# Patient Record
Sex: FEMALE | Race: BLACK OR AFRICAN AMERICAN
[De-identification: names, ages, dates, MRNs, and addresses within clinical notes are randomized per-mention and may not be internally consistent; named-entity substitution may affect disease eponyms.]

---

## 2017-10-23 ENCOUNTER — HOSPITAL ENCOUNTER (EMERGENCY)
Dept: HOSPITAL 74 - JER | Age: 42
LOS: 1 days | Discharge: HOME | End: 2017-10-24
Payer: COMMERCIAL

## 2017-10-23 VITALS — HEART RATE: 88 BPM | SYSTOLIC BLOOD PRESSURE: 146 MMHG | DIASTOLIC BLOOD PRESSURE: 91 MMHG

## 2017-10-23 VITALS — TEMPERATURE: 98.7 F

## 2017-10-23 VITALS — BODY MASS INDEX: 38.4 KG/M2

## 2017-10-23 DIAGNOSIS — E66.01: ICD-10-CM

## 2017-10-23 DIAGNOSIS — M79.7: ICD-10-CM

## 2017-10-23 DIAGNOSIS — F41.8: ICD-10-CM

## 2017-10-23 DIAGNOSIS — E03.9: ICD-10-CM

## 2017-10-23 DIAGNOSIS — R00.2: Primary | ICD-10-CM

## 2017-10-23 LAB
ALBUMIN SERPL-MCNC: 3.2 G/DL (ref 3.4–5)
ALP SERPL-CCNC: 67 U/L (ref 45–117)
ALT SERPL-CCNC: 24 U/L (ref 12–78)
ANION GAP SERPL CALC-SCNC: 6 MMOL/L (ref 8–16)
APPEARANCE UR: (no result)
AST SERPL-CCNC: 11 U/L (ref 15–37)
BASOPHILS # BLD: 0.8 % (ref 0–2)
BILIRUB SERPL-MCNC: 0.3 MG/DL (ref 0.2–1)
BILIRUB UR STRIP.AUTO-MCNC: NEGATIVE MG/DL
CALCIUM SERPL-MCNC: 8.4 MG/DL (ref 8.5–10.1)
CK SERPL-CCNC: 136 IU/L (ref 26–192)
CO2 SERPL-SCNC: 28 MMOL/L (ref 21–32)
COLOR UR: YELLOW
CREAT SERPL-MCNC: 0.7 MG/DL (ref 0.55–1.02)
D DIMER PPP FEU-MCNC: 293 NG/ML
DEPRECATED RDW RBC AUTO: 14.6 % (ref 11.6–15.6)
EOSINOPHIL # BLD: 1.7 % (ref 0–4.5)
GLUCOSE SERPL-MCNC: 74 MG/DL (ref 74–106)
INR BLD: 0.96 (ref 0.82–1.09)
KETONES UR QL STRIP: NEGATIVE
MAGNESIUM SERPL-MCNC: 2.2 MG/DL (ref 1.8–2.4)
MCH RBC QN AUTO: 28.1 PG (ref 25.7–33.7)
MCHC RBC AUTO-ENTMCNC: 33.4 G/DL (ref 32–36)
MCV RBC: 84.1 FL (ref 80–96)
MUCOUS THREADS URNS QL MICRO: (no result)
NEUTROPHILS # BLD: 60.7 % (ref 42.8–82.8)
NITRITE UR QL STRIP: NEGATIVE
PH UR: 8 [PH] (ref 5–8)
PLATELET # BLD AUTO: 334 K/MM3 (ref 134–434)
PMV BLD: 7.3 FL (ref 7.5–11.1)
PROT SERPL-MCNC: 7 G/DL (ref 6.4–8.2)
PROT UR QL STRIP: NEGATIVE
PROT UR QL STRIP: NEGATIVE
PT PNL PPP: 10.9 SEC (ref 9.98–11.88)
RBC # BLD AUTO: 49 /HPF (ref 0–3)
RBC # UR STRIP: (no result) /UL
SP GR UR: 1.02 (ref 1–1.02)
TROPONIN I SERPL-MCNC: < 0.02 NG/ML (ref 0–0.05)
UROBILINOGEN UR STRIP-MCNC: NEGATIVE MG/DL (ref 0.2–1)
WBC # BLD AUTO: 10.6 K/MM3 (ref 4–10)
WBC # UR AUTO: 10 /HPF (ref 3–5)
YEAST #/AREA URNS HPF: (no result) /[HPF]

## 2017-10-23 PROCEDURE — 3E0337Z INTRODUCTION OF ELECTROLYTIC AND WATER BALANCE SUBSTANCE INTO PERIPHERAL VEIN, PERCUTANEOUS APPROACH: ICD-10-PCS

## 2017-10-23 PROCEDURE — 3E033GC INTRODUCTION OF OTHER THERAPEUTIC SUBSTANCE INTO PERIPHERAL VEIN, PERCUTANEOUS APPROACH: ICD-10-PCS

## 2017-10-23 NOTE — PDOC
History of Present Illness





- General


History Source: Patient


Exam Limitations: No Limitations





<AnicetoNakita - Last Filed: 10/23/17 21:48>





<Breanna Ortiz - Last Filed: 10/24/17 01:01>





- General


Chief Complaint: Palpitations


Stated Complaint: CHEST PAIN


Time Seen by Provider: 10/23/17 18:08





- History of Present Illness


Initial Comments: 





10/23/17 21:48


The patient is a 42 year old female, with a significant past medical history of 

Morbid Obesity, HLD, Depression, Anxiety, Fibromyalgia, Arthritis who presents 

to the emergency department with palpitations for the past four days. Patient 

reports sudden onset of palpitations at rest, initially intermittent however 

became constant today. Patient reports associated SOB however denies any chest 

pain, diaphoresis or dyspnea on exertion. Patient presents to the ED for 

further evaluation.     





She denies headache or dizziness. She denies fever, chills, abdominal pain, 

nausea, vomit, diarrhea or constipation. She denies dysuria, frequency, urgency 

or hematuria. Patient denies sick contacts or recent travel. 





Allergies: Penicillins 


Past surgical history: Hernia, D&C, C Section 2013, Thyrdoidectomy, Breast 

reduction, 


Social history: Social EtOH use. Former smoker (2011) Denied IVDA.


PCP: Dr. Marcos Anguiano 








 (Nakita Moreland)








Past History





<Nakita Moreland - Last Filed: 10/23/17 21:48>





- Past Medical History


Cardiac Disorders: Yes (heart murmur)


Hypercholesterolemia: Yes


Psychiatric Problems: Yes (depression/anxiety)


Thyroid Disease: Yes


Other medical history: fibromyalgia, inflammation of fascits, arthritis, 

pinched nerves on back





- Surgical History


Abdominal Surgery: Yes (Hernia, and D&C)





- Immunization History


Immunization Up to Date: Yes





- Suicide/Smoking/Psychosocial Hx


Smoking History: Never smoked


Have you smoked in the past 12 months: No


Information on smoking cessation initiated: No


Hx Alcohol Use: No


Drug/Substance Use Hx: No


Substance Use Type: Alcohol





<Breanna Ortiz - Last Filed: 10/24/17 01:01>





- Past Medical History


Allergies/Adverse Reactions: 


 Allergies











Allergy/AdvReac Type Severity Reaction Status Date / Time


 


Penicillins Allergy   Verified 10/23/17 20:22











Home Medications: 


Ambulatory Orders





Clonazepam [Klonopin] 1.25 mg PO DAILY 07/16/14 


Pregabalin [Lyrica -] 300 mg PO BID 07/16/14 


Cholecalciferol (Vitamin D3) [Vitamin D-3] 2,000 unit PO DAILY 02/02/16 


Cyclobenzaprine HCl [Flexeril -] 10 mg PO TID PRN 02/02/16 


Duloxetine HCl 60 mg PO DAILY 02/02/16 


Ibuprofen 600 mg PO TID 02/02/16 


Levothyroxine [Synthroid -] 88 mcg PO DAILY 02/02/16 


Atorvastatin Ca [Lipitor] 10 mg PO HS 10/23/17 


Frovatriptan Succinate [Frova] 2.5 mg PO ASDIR PRN 10/23/17 











**Review of Systems





- Review of Systems


Able to Perform ROS?: Yes





<Nakita Moreland - Last Filed: 10/23/17 21:48>





<Breanna Ortiz - Last Filed: 10/24/17 01:01>





- Review of Systems


Comments:: 





10/23/17 21:48


CONSTITUTIONAL: 


Absent: fever, chills, diaphoresis, generalized weakness, malaise, loss of 

appetite


HEENT: 


Absent: rhinorrhea, nasal congestion, throat pain, throat swelling, difficulty 

swallowing, mouth swelling, ear pain, eye pain, visual Changes


CARDIOVASCULAR: +palpitations. 


Absent: chest pain, syncope, palpitations, irregular heart rate, lightheadedness

, peripheral edema


RESPIRATORY: 


Absent: cough, shortness of breath, dyspnea with exertion, orthopnea, wheezing, 

stridor, hemoptysis


GASTROINTESTINAL:


Absent: abdominal pain, abdominal distension, nausea, vomiting, diarrhea, 

constipation, melena, hematochezia


GENITOURINARY: 


Absent: dysuria, frequency, urgency, hesitancy, hematuria, flank pain, genital 

pain


MUSCULOSKELETAL: 


Absent: myalgia, arthralgia, joint swelling


SKIN: 


Absent: rash, itching, pallor


HEMATOLOGIC/IMMUNOLOGIC: 


Absent: easy bleeding, easy bruising, lymphadenopathy, frequent infections


ENDOCRINE:


Absent: unexplained weight gain, unexplained weight loss, heat intolerance, 

cold intolerance


NEUROLOGIC: 


Absent: headache, focal weakness or paresthesias, dizziness, unsteady gait, 

seizure, mental status changes, bladder or bowel incontinence


PSYCHIATRIC: 


Absent: anxiety, depression, suicidal or homicidal ideation, hallucinations.


 (Nakita Moreland)








*Physical Exam





<Nakita Moreland - Last Filed: 10/23/17 21:48>





<Breanna Ortiz - Last Filed: 10/24/17 01:01>





- Vital Signs


 Last Vital Signs











Temp Pulse Resp BP Pulse Ox


 


 98.7 F   88   18   146/91   100 


 


 10/23/17 17:14  10/23/17 19:58  10/23/17 19:58  10/23/17 19:58  10/23/17 19:58

















- Physical Exam


Comments: 





10/23/17 21:49


GENERAL:


+Obese. Well developed, well nourished. Awake and alert. No acute distress.


HEENT:


Normocephalic, atraumatic. PERRLA, EOMI. No conjunctival pallor. Sclera are non-

icteric. Moist mucous membranes. Oropharynx is clear.


NECK: Supple. Full ROM. No JVD. Carotid pulses 2+ and symmetric, without 

bruits. No thyromegaly. No lymphadenopathy.


CARDIOVASCULAR:


+Sinus tachycardia. Regular rhythm. No murmurs, rubs, or gallops. Distal pulses 

are 2+ and symmetric. 


PULMONARY: 


No evidence of respiratory distress. Lungs clear to auscultation bilaterally. 

No wheezing, rales or rhonchi.


ABDOMINAL:


+Protuberant. Soft. Non-tender. Non-distended. No rebound or guarding. No 

organomegaly. Normoactive bowel sounds. 


MUSCULOSKELETAL 


Normal range of motion at all joints. No bony deformities or tenderness. No CVA 

tenderness.


EXTREMITIES: 


No cyanosis. No clubbing. No edema. No calf tenderness.


SKIN: Warm and dry. Normal capillary refill. No rashes. No jaundice. 


NEUROLOGICAL: Alert, awake, appropriate. Cranial nerves 2-12 intact. No 

deficits to light touch and temperature in face, upper extremities and lower 

extremities. No motor deficits in the in face, upper extremities and lower 

extremities. Normoreflexic in the upper and lower extremities. Normal speech. 

Toes are down-going bilaterally. Gait is normal without ataxia.


PSYCHIATRIC: 


Cooperative. Good eye contact. Appropriate mood and affect.





 (Nakita Moreland)








ED Treatment Course





- LABORATORY


CBC & Chemistry Diagram: 


 10/23/17 19:40





 10/23/17 19:40





<Nakita Moreland - Last Filed: 10/23/17 21:48>





- LABORATORY


CBC & Chemistry Diagram: 


 10/23/17 19:40





 10/23/17 19:40





<Breanna Ortiz - Last Filed: 10/24/17 01:01>





- ADDITIONAL ORDERS


Additional order review: 


 Laboratory  Results











  10/23/17 10/23/17 10/23/17





  20:30 20:30 19:40


 


PT with INR   


 


INR   


 


D-Dimer   


 


Sodium    139


 


Potassium    4.3


 


Chloride    105


 


Carbon Dioxide    28


 


Anion Gap    6 L


 


BUN    7  D


 


Creatinine    0.7


 


Creat Clearance w eGFR    > 60


 


Random Glucose    74


 


Calcium    8.4 L


 


Magnesium    2.2


 


Total Bilirubin    0.3


 


AST    11 L D


 


ALT    24


 


Alkaline Phosphatase    67


 


Creatine Kinase    136


 


Troponin I    < 0.02


 


B-Natriuretic Peptide    69.90


 


Total Protein    7.0


 


Albumin    3.2 L


 


Serum Pregnancy, Qual  Negative  


 


Urine Color   Yellow 


 


Urine Appearance   Cloudy 


 


Urine pH   8.0  D 


 


Ur Specific Gravity   1.020 


 


Urine Protein   Negative 


 


Urine Glucose (UA)   Negative 


 


Urine Ketones   Negative 


 


Urine Blood   3+ H 


 


Urine Nitrite   Negative 


 


Urine Bilirubin   Negative 


 


Urine Urobilinogen   Negative 


 


Ur Leukocyte Esterase   Negative 


 


Urine RBC   49 


 


Urine WBC   10 


 


Ur Epithelial Cells   Rare 


 


Urine Mucus   Rare 


 


Urine Yeast   Few 














  10/23/17





  19:40


 


PT with INR  10.90


 


INR  0.96


 


D-Dimer  293 H


 


Sodium 


 


Potassium 


 


Chloride 


 


Carbon Dioxide 


 


Anion Gap 


 


BUN 


 


Creatinine 


 


Creat Clearance w eGFR 


 


Random Glucose 


 


Calcium 


 


Magnesium 


 


Total Bilirubin 


 


AST 


 


ALT 


 


Alkaline Phosphatase 


 


Creatine Kinase 


 


Troponin I 


 


B-Natriuretic Peptide 


 


Total Protein 


 


Albumin 


 


Serum Pregnancy, Qual 


 


Urine Color 


 


Urine Appearance 


 


Urine pH 


 


Ur Specific Gravity 


 


Urine Protein 


 


Urine Glucose (UA) 


 


Urine Ketones 


 


Urine Blood 


 


Urine Nitrite 


 


Urine Bilirubin 


 


Urine Urobilinogen 


 


Ur Leukocyte Esterase 


 


Urine RBC 


 


Urine WBC 


 


Ur Epithelial Cells 


 


Urine Mucus 


 


Urine Yeast 








 











  10/23/17





  19:40


 


RBC  4.12


 


MCV  84.1


 


MCHC  33.4


 


RDW  14.6


 


MPV  7.3 L D


 


Neutrophils %  60.7  D


 


Lymphocytes %  28.2  D


 


Monocytes %  8.6


 


Eosinophils %  1.7


 


Basophils %  0.8

















- RADIOLOGY


Radiology Studies Ordered: 














 Category Date Time Status


 


 CHEST CTA [CT] Stat CT Scan  10/23/17 21:02 Completed

















- Medications


Given in the ED: 


ED Medications














Discontinued Medications














Generic Name Dose Route Start Last Admin





  Trade Name Freq  PRN Reason Stop Dose Admin


 


Aspirin  162 mg 10/23/17 19:22 10/23/17 19:56





  Asa -  PO 10/23/17 19:23  162 mg





  ONCE ONE   Administration


 


Diphenhydramine HCl  25 mg 10/23/17 22:21 10/23/17 22:50





  Benadryl Injection -  IVPUSH 10/23/17 22:22  25 mg





  ONCE ONE   Administration


 


Sodium Chloride  1,000 mls @ 1,000 mls/hr 10/23/17 21:03 10/23/17 21:32





  Normal Saline -  IV 10/23/17 22:02  1,000 mls/hr





  ASDIR STA   Administration


 


Ondansetron HCl  4 mg 10/23/17 22:20 10/23/17 22:50





  Zofran Injection  IVPUSH 10/23/17 22:21  4 mg





  ONCE ONE   Administration

















Medical Decision Making





<Nakita Moreland - Last Filed: 10/23/17 21:48>





<Breanna Ortiz - Last Filed: 10/24/17 01:01>





- Medical Decision Making





10/24/17 00:58


42-year-old female presents with 4 days of palpitations and has been feeling 

short of breath today.  She has past medical history significant for DVT, 

fibromyalgia, obesity, hypercholesterolemia





EKG was normal sinus rhythm at 99 bpm.  When compared to the previous EKG there 

is no significant changes





She was not hypoxic.





CBC was unremarkable.  Her electrolytes, liver function tests, cardiac enzymes 

were all within normal limits





CT of the chest was negative for any pulmonary embolus





Impression palpitations





On further discussion, the patient did admit to see a cardiologist and having a 

echo done, but then never went back to see him whenshe became symptomatic (

Breanna Ortiz)








*DC/Admit/Observation/Transfer





<Nakita Moreland - Last Filed: 10/23/17 21:48>





<Breanna Ortiz - Last Filed: 10/24/17 01:01>


Diagnosis at time of Disposition: 


 Palpitations





- Discharge Dispostion


Disposition: HOME


Condition at time of disposition: Stable





- Referrals


Referrals: 


Silvio Rodríguez [Primary Care Provider] - 





- Patient Instructions


Printed Discharge Instructions:  DI for Palpitations


Additional Instructions: 


please follow up with your regular physician 


I have given you the name of a heart doctor for further evaluation


return for any worsening  symptoms





- Attestations


Scribe Attestion: 





10/23/17 21:49





Documentation prepared by Nakita Moreland, acting as medical scribe for Breanna Ortiz MD (Nakita Moreland)

## 2017-10-25 NOTE — EKG
Test Reason : 

Blood Pressure : ***/*** mmHG

Vent. Rate : 099 BPM     Atrial Rate : 099 BPM

   P-R Int : 168 ms          QRS Dur : 082 ms

    QT Int : 350 ms       P-R-T Axes : 061 056 023 degrees

   QTc Int : 449 ms

 

NORMAL SINUS RHYTHM

NONSPECIFIC T WAVE ABNORMALITY

ABNORMAL ECG

WHEN COMPARED WITH ECG OF 02-FEB-2016 20:03,

NO SIGNIFICANT CHANGE WAS FOUND

Confirmed by MIR TAYLOR MD ([id number]) on 10/25/2017 9:33:53 AM

 

Referred By:             Confirmed By:MIR TAYLOR MD

## 2019-04-22 ENCOUNTER — HOSPITAL ENCOUNTER (EMERGENCY)
Dept: HOSPITAL 74 - JERFT | Age: 44
Discharge: HOME | End: 2019-04-22
Payer: COMMERCIAL

## 2019-04-22 VITALS — SYSTOLIC BLOOD PRESSURE: 120 MMHG | HEART RATE: 91 BPM | DIASTOLIC BLOOD PRESSURE: 77 MMHG | TEMPERATURE: 98.5 F

## 2019-04-22 VITALS — BODY MASS INDEX: 37.1 KG/M2

## 2019-04-22 DIAGNOSIS — S93.401A: Primary | ICD-10-CM

## 2019-04-22 DIAGNOSIS — R01.1: ICD-10-CM
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
## 2019-04-22 DIAGNOSIS — M79.7: ICD-10-CM
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
## 2019-04-22 DIAGNOSIS — F41.8: ICD-10-CM
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
## 2019-04-22 DIAGNOSIS — S80.02XA: ICD-10-CM

## 2019-04-22 DIAGNOSIS — E78.00: ICD-10-CM

## 2019-04-22 DIAGNOSIS — F32.9: ICD-10-CM

## 2019-04-22 NOTE — PDOC
History of Present Illness





- General


Chief Complaint: Injury


Stated Complaint: FALL


Time Seen by Provider: 04/22/19 17:00





- History of Present Illness


Initial Comments: 





04/22/19 18:10


44-year-old female with a past medical history of dyslipidemia and fibromyalgia 

presents after a mechanical fall today for right ankle pain and left knee pain. 

No loss consciousness or post injury nausea vomiting or visual changes.





Past History





- Past Medical History


Allergies/Adverse Reactions: 


 Allergies











Allergy/AdvReac Type Severity Reaction Status Date / Time


 


Penicillins Allergy   Verified 04/22/19 17:23











Home Medications: 


Ambulatory Orders





Clonazepam [Klonopin] 1.25 mg PO DAILY 07/16/14 


Pregabalin [Lyrica -] 300 mg PO BID 07/16/14 


Cholecalciferol (Vitamin D3) [Vitamin D-3] 2,000 unit PO DAILY 02/02/16 


Cyclobenzaprine HCl [Flexeril -] 10 mg PO TID PRN 02/02/16 


Duloxetine HCl 60 mg PO DAILY 02/02/16 


Ibuprofen 600 mg PO TID 02/02/16 


Levothyroxine [Synthroid -] 88 mcg PO DAILY 02/02/16 


Atorvastatin Ca [Lipitor] 10 mg PO HS 10/23/17 


Frovatriptan Succinate [Frova] 2.5 mg PO ASDIR PRN 10/23/17 








Cardiac Disorders: Yes (heart murmur)


COPD: No


Hypercholesterolemia: Yes


Psychiatric Problems: Yes (depression/anxiety)


Thyroid Disease: Yes





- Surgical History


Abdominal Surgery: Yes (Hernia, and D&C)





- Immunization History


Immunization Up to Date: Yes





- Suicide/Smoking/Psychosocial Hx


Smoking History: Never smoked


Have you smoked in the past 12 months: No


Information on smoking cessation initiated: No


Hx Alcohol Use: No


Drug/Substance Use Hx: No


Substance Use Type: Alcohol





**Review of Systems





- Review of Systems


Musculoskeletal: Yes: See HPI, Joint Pain





*Physical Exam





- Vital Signs


 Last Vital Signs











Temp Pulse Resp BP Pulse Ox


 


 98.5 F   91 H  16   120/77   100 


 


 04/22/19 17:00  04/22/19 17:00  04/22/19 17:00  04/22/19 17:00  04/22/19 17:00














- Physical Exam


Comments: 





04/22/19 18:11


Left knee skin color and temperature are normal range of motion is full. She 

has diffuse tenderness which is out of proportion to the examination. No 

instability or gross sensorimotor deficits. She is neurovascularly intact. Eyes 

and calves are soft and nontender.





Right ankle skin color and temperature are normal. There is mild lateral 

swelling. She has no tenderness about the right knee proximal fibula or along 

its distal coarse. No tenderness about the medial malleolus navicular base of 

the fifth metatarsal. Mild tenderness over the lateral malleolus and ATFL. She 

is unable to tolerate stability testing she is neurovascularly intact free of 

any gross sensorimotor deficits thighs and calves are soft and nontender.





Medical Decision Making





- Medical Decision Making





04/22/19 18:12


This is a 44-year-old female with a past medical history of fibromyalgia. She 

is very tender all over even to light touch which is out of proportion to the 

examination. This is a right ankle sprain in the left knee contusion. She may 

weight-bear as tolerated with the use of crutches and Aircast follow-up with 

orthopedic surgery discussed use of Tylenol and Motrin for pain.





*DC/Admit/Observation/Transfer


Diagnosis at time of Disposition: 


 Ankle sprain, Knee contusion





Ankle injury


Qualifiers:


 Encounter type: initial encounter Laterality: right Qualified Code(s): 

S99.911A - Unspecified injury of right ankle, initial encounter








- Discharge Dispostion


Disposition: HOME


Condition at time of disposition: Stable


Decision to Admit order: No





- Referrals


Referrals: 


ON STAFF,NOT [Primary Care Provider] - 


Magno Jimenez DO [Staff Physician] - 





- Patient Instructions


Printed Discharge Instructions:  Ankle Sprain, DI for Ankle Sprain, Contusion


Additional Instructions: 


He may weight-bear as tolerated with use of crutches and the Aircast. Tylenol 

and Motrin as directed for pain. Return to the emergency room for worsening 

symptoms and follow-up with orthopedic surgery in 1-2 days for further 

evaluation and treatment options. He may remove the ankle splint for hygiene 

and comfort as well as gentle range of motion exercises.





- Post Discharge Activity

## 2019-10-18 ENCOUNTER — HOSPITAL ENCOUNTER (EMERGENCY)
Dept: HOSPITAL 74 - JERFT | Age: 44
Discharge: HOME | End: 2019-10-18
Payer: COMMERCIAL

## 2019-10-18 VITALS — TEMPERATURE: 97.5 F | DIASTOLIC BLOOD PRESSURE: 84 MMHG | SYSTOLIC BLOOD PRESSURE: 127 MMHG | HEART RATE: 83 BPM

## 2019-10-18 VITALS — BODY MASS INDEX: 36.1 KG/M2
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
## 2019-10-18 DIAGNOSIS — Z88.0: ICD-10-CM

## 2019-10-18 DIAGNOSIS — S93.401A: Primary | ICD-10-CM
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
## 2019-10-18 DIAGNOSIS — E78.00: ICD-10-CM

## 2019-10-18 DIAGNOSIS — F41.8: ICD-10-CM

## 2019-10-18 DIAGNOSIS — R01.1: ICD-10-CM
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
## 2019-10-18 DIAGNOSIS — M79.7: ICD-10-CM

## 2019-10-18 DIAGNOSIS — E07.9: ICD-10-CM

## 2019-10-18 DIAGNOSIS — K46.9: ICD-10-CM

## 2019-10-18 NOTE — PDOC
History of Present Illness





- General


Chief Complaint: Injury


Stated Complaint: INJURY


Time Seen by Provider: 10/18/19 15:23


History Source: Patient





- History of Present Illness


Occurred: reports: this afternoon


Pain Location: reports: lower extremity


Method of Injury: Yes: fall





Past History





- Past Medical History


Allergies/Adverse Reactions: 


 Allergies











Allergy/AdvReac Type Severity Reaction Status Date / Time


 


Penicillins Allergy   Verified 10/18/19 15:26











Home Medications: 


Ambulatory Orders





Clonazepam [Klonopin] 1.25 mg PO DAILY 07/16/14 


Pregabalin [Lyrica -] 300 mg PO BID 07/16/14 


Cholecalciferol (Vitamin D3) [Vitamin D-3] 2,000 unit PO DAILY 02/02/16 


Cyclobenzaprine HCl [Flexeril -] 10 mg PO TID PRN 02/02/16 


Duloxetine HCl 60 mg PO DAILY 02/02/16 


Ibuprofen 600 mg PO TID 02/02/16 


Levothyroxine [Synthroid -] 88 mcg PO DAILY 02/02/16 


Atorvastatin Ca [Lipitor] 10 mg PO HS 10/23/17 


Frovatriptan Succinate [Frova] 2.5 mg PO ASDIR PRN 10/23/17 








Cardiac Disorders: Yes (heart murmur)


COPD: No


Hypercholesterolemia: Yes


Psychiatric Problems: Yes (depression/anxiety)


Thyroid Disease: Yes


Other medical history: fibromyalgia





- Surgical History


Abdominal Surgery: Yes (Hernia, and D&C)





- Immunization History


Immunization Up to Date: Yes





- Psycho Social/Smoking Cessation Hx


Smoking History: Never smoked


Have you smoked in the past 12 months: No


Hx Alcohol Use: Yes


Drug/Substance Use Hx: No


Substance Use Type: Alcohol





**Review of Systems





- Review of Systems


Musculoskeletal: Yes: Joint Pain, Joint Swelling.  No: Back Pain, Neck Pain


Neurological: No: Headache





*Physical Exam





- Vital Signs


 Last Vital Signs











Temp Pulse Resp BP Pulse Ox


 


 97.5 F L  83   16   127/84   100 


 


 10/18/19 15:22  10/18/19 15:22  10/18/19 15:22  10/18/19 15:22  10/18/19 15:22














- Physical Exam


General Appearance: Yes: Appropriately Dressed, Mild Distress


HEENT: positive: Normal Voice


Neck: positive: Supple


Respiratory/Chest: negative: Respiratory Distress


Extremity: positive: Swelling (minimal to lat malleolus of R ankle)


Integumentary: positive: Dry, Warm


Neurologic: positive: Fully Oriented, Alert, Normal Mood/Affect





Medical Decision Making





- Medical Decision Making





10/18/19 15:39


44-year-old female, no significant history, here w/ R ankle pain and swelling s/

p fall.  Patient states today at work, her R ankle gave out causing her to fall 

forward.  States ankle pain now radiating up to her right hip.  Painful to bear 

weight.  Patient reports that her ankle has given out multiple times in the 

past but has never been evaluated by orthopedics.





see exam





R ankle sprain


XR neg


-ACE placed and crutches given, dose of motrin given


-Dc w/ [facility] and ortho f/u











Discharge





- Discharge Information


Problems reviewed: Yes


Clinical Impression/Diagnosis: 


Fall


Qualifiers:


 Encounter type: initial encounter Qualified Code(s): W19.XXXA - Unspecified 

fall, initial encounter





Ankle sprain


Qualifiers:


 Encounter type: initial encounter Involved ligament of ankle: unspecified 

ligament Laterality: right Qualified Code(s): S93.401A - Sprain of unspecified 

ligament of right ankle, initial encounter





Disposition: HOME





- Follow up/Referral


Referrals: 


Manny Khan MD [Staff Physician] - 





- Patient Discharge Instructions


Patient Printed Discharge Instructions:  DI for Ankle Sprain


Additional Instructions: 


Take Motrin or Tylenol for pain and follow-up with Dr. Khan of orthopedics





- Post Discharge Activity


Work/Back to School Note:  Back to Work

## 2019-10-18 NOTE — PDOC
Rapid Medical Evaluation


Time Seen by Provider: 10/18/19 15:23


Medical Evaluation: 


 Allergies











Allergy/AdvReac Type Severity Reaction Status Date / Time


 


Penicillins Allergy   Verified 04/22/19 17:23











10/18/19 15:23


I have performed a brief in-person evaluation of this patient.





The patient presents with a chief complaint of: s/p fall at work. Her ankle 

gave out on her and she landed on 





Pertinent physical exam findings: R sided hip/knee/ankle tenderness





I have ordered the following: Xrays





The patient will proceed to the ED for further evaluation.





**Discharge Disposition





- Diagnosis


 Fall








- Referrals





- Patient Instructions





- Post Discharge Activity

## 2021-10-27 ENCOUNTER — HOSPITAL ENCOUNTER (EMERGENCY)
Dept: HOSPITAL 74 - JER | Age: 46
LOS: 1 days | Discharge: HOME | End: 2021-10-28
Payer: COMMERCIAL

## 2021-10-27 VITALS — DIASTOLIC BLOOD PRESSURE: 86 MMHG | SYSTOLIC BLOOD PRESSURE: 158 MMHG | TEMPERATURE: 98.4 F

## 2021-10-27 VITALS — BODY MASS INDEX: 40.6 KG/M2

## 2021-10-27 DIAGNOSIS — R00.2: ICD-10-CM

## 2021-10-27 DIAGNOSIS — N83.201: Primary | ICD-10-CM

## 2021-10-27 DIAGNOSIS — N39.0: ICD-10-CM

## 2021-10-27 PROCEDURE — 3E033NZ INTRODUCTION OF ANALGESICS, HYPNOTICS, SEDATIVES INTO PERIPHERAL VEIN, PERCUTANEOUS APPROACH: ICD-10-PCS

## 2021-10-28 VITALS — HEART RATE: 74 BPM

## 2021-10-28 LAB
ALBUMIN SERPL-MCNC: 3.6 G/DL (ref 3.4–5)
ALP SERPL-CCNC: 75 U/L (ref 45–117)
ALT SERPL-CCNC: 31 U/L (ref 13–61)
ANION GAP SERPL CALC-SCNC: 5 MMOL/L (ref 8–16)
APPEARANCE UR: CLEAR
APTT BLD: 31.4 SECONDS (ref 25.2–36.5)
AST SERPL-CCNC: 25 U/L (ref 15–37)
BACTERIA # UR AUTO: 74 /UL (ref 0–1359)
BASOPHILS # BLD: 1.2 % (ref 0–2)
BILIRUB SERPL-MCNC: 0.3 MG/DL (ref 0.2–1)
BILIRUB UR STRIP.AUTO-MCNC: NEGATIVE MG/DL
BUN SERPL-MCNC: 10.9 MG/DL (ref 7–18)
CALCIUM SERPL-MCNC: 8.9 MG/DL (ref 8.5–10.1)
CASTS URNS QL MICRO: 0 /UL (ref 0–3.1)
CHLORIDE SERPL-SCNC: 106 MMOL/L (ref 98–107)
CO2 SERPL-SCNC: 29 MMOL/L (ref 21–32)
COLOR UR: YELLOW
CREAT SERPL-MCNC: 0.9 MG/DL (ref 0.55–1.3)
DEPRECATED RDW RBC AUTO: 14.9 % (ref 11.6–15.6)
EOSINOPHIL # BLD: 2.5 % (ref 0–4.5)
EPITH CASTS URNS QL MICRO: 13 /UL (ref 0–25.1)
GLUCOSE SERPL-MCNC: 126 MG/DL (ref 74–106)
HCT VFR BLD CALC: 35.6 % (ref 32.4–45.2)
HGB BLD-MCNC: 12 GM/DL (ref 10.7–15.3)
INR BLD: 1.01 (ref 0.83–1.09)
KETONES UR QL STRIP: NEGATIVE
LEUKOCYTE ESTERASE UR QL STRIP.AUTO: (no result)
LYMPHOCYTES # BLD: 30.4 % (ref 8–40)
MCH RBC QN AUTO: 28.2 PG (ref 25.7–33.7)
MCHC RBC AUTO-ENTMCNC: 33.8 G/DL (ref 32–36)
MCV RBC: 83.3 FL (ref 80–96)
MONOCYTES # BLD AUTO: 4.9 % (ref 3.8–10.2)
NEUTROPHILS # BLD: 61 % (ref 42.8–82.8)
NITRITE UR QL STRIP: NEGATIVE
PH UR: 5.5 [PH] (ref 5–8)
PLATELET # BLD AUTO: 396 10^3/UL (ref 134–434)
PMV BLD: 7.2 FL (ref 7.5–11.1)
PROT SERPL-MCNC: 8.1 G/DL (ref 6.4–8.2)
PROT UR QL STRIP: NEGATIVE
PROT UR QL STRIP: NEGATIVE
PT PNL PPP: 11.3 SEC (ref 9.7–13)
RBC # BLD AUTO: 17 /UL (ref 0–23.9)
RBC # BLD AUTO: 4.27 M/MM3 (ref 3.6–5.2)
SODIUM SERPL-SCNC: 139 MMOL/L (ref 136–145)
SP GR UR: 1.02 (ref 1.01–1.03)
UROBILINOGEN UR STRIP-MCNC: 0.2 MG/DL (ref 0.2–1)
WBC # BLD AUTO: 10.7 K/MM3 (ref 4–10)
WBC # UR AUTO: 29 /UL (ref 0–25.8)

## 2021-11-05 ENCOUNTER — HOSPITAL ENCOUNTER (INPATIENT)
Dept: HOSPITAL 74 - JER | Age: 46
LOS: 3 days | Discharge: HOME | DRG: 111 | End: 2021-11-08
Attending: GENERAL ACUTE CARE HOSPITAL | Admitting: INTERNAL MEDICINE
Payer: COMMERCIAL

## 2021-11-05 VITALS — BODY MASS INDEX: 38.2 KG/M2

## 2021-11-05 DIAGNOSIS — E03.9: ICD-10-CM

## 2021-11-05 DIAGNOSIS — Z86.718: ICD-10-CM

## 2021-11-05 DIAGNOSIS — E66.9: ICD-10-CM

## 2021-11-05 DIAGNOSIS — E78.5: ICD-10-CM

## 2021-11-05 DIAGNOSIS — N39.0: ICD-10-CM

## 2021-11-05 DIAGNOSIS — R07.89: ICD-10-CM

## 2021-11-05 DIAGNOSIS — R42: Primary | ICD-10-CM

## 2021-11-05 DIAGNOSIS — M79.7: ICD-10-CM

## 2021-11-05 DIAGNOSIS — L90.0: ICD-10-CM

## 2021-11-05 DIAGNOSIS — N83.201: ICD-10-CM

## 2021-11-05 DIAGNOSIS — Z88.0: ICD-10-CM

## 2021-11-05 DIAGNOSIS — R74.01: ICD-10-CM

## 2021-11-05 DIAGNOSIS — M62.81: ICD-10-CM

## 2021-11-05 DIAGNOSIS — I10: ICD-10-CM

## 2021-11-05 DIAGNOSIS — R10.2: ICD-10-CM

## 2021-11-05 LAB
ALBUMIN SERPL-MCNC: 3.4 G/DL (ref 3.4–5)
ALP SERPL-CCNC: 121 U/L (ref 45–117)
ALT SERPL-CCNC: 69 U/L (ref 13–61)
ANION GAP SERPL CALC-SCNC: 11 MMOL/L (ref 8–16)
ANISOCYTOSIS BLD QL: (no result)
APPEARANCE UR: CLEAR
AST SERPL-CCNC: 75 U/L (ref 15–37)
BACTERIA # UR AUTO: 411 /UL (ref 0–1359)
BILIRUB SERPL-MCNC: 0.5 MG/DL (ref 0.2–1)
BILIRUB UR STRIP.AUTO-MCNC: (no result) MG/DL
BUN SERPL-MCNC: 10.8 MG/DL (ref 7–18)
CALCIUM SERPL-MCNC: 8.7 MG/DL (ref 8.5–10.1)
CASTS URNS QL MICRO: 13 /UL (ref 0–3.1)
CHLORIDE SERPL-SCNC: 104 MMOL/L (ref 98–107)
CO2 SERPL-SCNC: 23 MMOL/L (ref 21–32)
COLOR UR: (no result)
CREAT SERPL-MCNC: 0.8 MG/DL (ref 0.55–1.3)
DEPRECATED RDW RBC AUTO: 15 % (ref 11.6–15.6)
EPITH CASTS URNS QL MICRO: >36 /UL (ref 0–25.1)
ERYTHROCYTE [SEDIMENTATION RATE] IN BLOOD BY WESTERGREN METHOD: 41 MM/HR (ref 0–20)
GLUCOSE SERPL-MCNC: 91 MG/DL (ref 74–106)
HCT VFR BLD CALC: 39.2 % (ref 32.4–45.2)
HGB BLD-MCNC: 13.1 GM/DL (ref 10.7–15.3)
KETONES UR QL STRIP: (no result)
LEUKOCYTE ESTERASE UR QL STRIP.AUTO: (no result)
LIPASE SERPL-CCNC: 71 U/L (ref 73–393)
MACROCYTES BLD QL: 0
MAGNESIUM SERPL-MCNC: 2.2 MG/DL (ref 1.8–2.4)
MCH RBC QN AUTO: 27.7 PG (ref 25.7–33.7)
MCHC RBC AUTO-ENTMCNC: 33.5 G/DL (ref 32–36)
MCV RBC: 82.9 FL (ref 80–96)
NITRITE UR QL STRIP: NEGATIVE
PH UR: 6 [PH] (ref 5–8)
PLATELET # BLD AUTO: 256 10^3/UL (ref 134–434)
PLATELET BLD QL SMEAR: NORMAL
PMV BLD: 8 FL (ref 7.5–11.1)
PROT SERPL-MCNC: 8 G/DL (ref 6.4–8.2)
PROT UR QL STRIP: (no result)
PROT UR QL STRIP: NEGATIVE
RBC # BLD AUTO: 12 /UL (ref 0–23.9)
RBC # BLD AUTO: 4.73 M/MM3 (ref 3.6–5.2)
SODIUM SERPL-SCNC: 138 MMOL/L (ref 136–145)
SP GR UR: 1.04 (ref 1.01–1.03)
UROBILINOGEN UR STRIP-MCNC: 1 MG/DL (ref 0.2–1)
WBC # BLD AUTO: 5.1 K/MM3 (ref 4–10)
WBC # UR AUTO: 64 /UL (ref 0–25.8)

## 2021-11-05 PROCEDURE — U0003 INFECTIOUS AGENT DETECTION BY NUCLEIC ACID (DNA OR RNA); SEVERE ACUTE RESPIRATORY SYNDROME CORONAVIRUS 2 (SARS-COV-2) (CORONAVIRUS DISEASE [COVID-19]), AMPLIFIED PROBE TECHNIQUE, MAKING USE OF HIGH THROUGHPUT TECHNOLOGIES AS DESCRIBED BY CMS-2020-01-R: HCPCS

## 2021-11-05 PROCEDURE — U0005 INFEC AGEN DETEC AMPLI PROBE: HCPCS

## 2021-11-05 PROCEDURE — C9803 HOPD COVID-19 SPEC COLLECT: HCPCS

## 2021-11-06 LAB
ALBUMIN SERPL-MCNC: 2.9 G/DL (ref 3.4–5)
ALP SERPL-CCNC: 90 U/L (ref 45–117)
ALT SERPL-CCNC: 52 U/L (ref 13–61)
ANION GAP SERPL CALC-SCNC: 5 MMOL/L (ref 8–16)
ANISOCYTOSIS BLD QL: (no result)
APTT BLD: 24.2 SECONDS (ref 25.2–36.5)
AST SERPL-CCNC: 44 U/L (ref 15–37)
BILIRUB SERPL-MCNC: 0.3 MG/DL (ref 0.2–1)
BUN SERPL-MCNC: 8.8 MG/DL (ref 7–18)
CALCIUM SERPL-MCNC: 8.1 MG/DL (ref 8.5–10.1)
CHLORIDE SERPL-SCNC: 106 MMOL/L (ref 98–107)
CHOLEST SERPL-MCNC: 186 MG/DL (ref 50–200)
CO2 SERPL-SCNC: 28 MMOL/L (ref 21–32)
CREAT SERPL-MCNC: 0.7 MG/DL (ref 0.55–1.3)
DEPRECATED RDW RBC AUTO: 15.1 % (ref 11.6–15.6)
GLUCOSE SERPL-MCNC: 93 MG/DL (ref 74–106)
HCT VFR BLD CALC: 35.7 % (ref 32.4–45.2)
HDLC SERPL-MCNC: 22 MG/DL (ref 40–60)
HGB BLD-MCNC: 11.7 GM/DL (ref 10.7–15.3)
INR BLD: 1.13 (ref 0.83–1.09)
LDLC SERPL CALC-MCNC: 110 MG/DL (ref 5–100)
MAGNESIUM SERPL-MCNC: 2 MG/DL (ref 1.8–2.4)
MCH RBC QN AUTO: 27.9 PG (ref 25.7–33.7)
MCHC RBC AUTO-ENTMCNC: 32.9 G/DL (ref 32–36)
MCV RBC: 84.8 FL (ref 80–96)
PHOSPHATE SERPL-MCNC: 2.7 MG/DL (ref 2.5–4.9)
PLATELET # BLD AUTO: 232 10^3/UL (ref 134–434)
PLATELET BLD QL SMEAR: NORMAL
PMV BLD: 8 FL (ref 7.5–11.1)
PROT SERPL-MCNC: 7 G/DL (ref 6.4–8.2)
PT PNL PPP: 13.2 SEC (ref 9.7–13)
RBC # BLD AUTO: 4.21 M/MM3 (ref 3.6–5.2)
SODIUM SERPL-SCNC: 139 MMOL/L (ref 136–145)
TRIGL SERPL-MCNC: 231 MG/DL (ref 0–150)
WBC # BLD AUTO: 4.6 K/MM3 (ref 4–10)

## 2021-11-06 RX ADMIN — AZTREONAM SCH MLS/HR: 1 INJECTION, POWDER, LYOPHILIZED, FOR SOLUTION INTRAMUSCULAR; INTRAVENOUS at 09:21

## 2021-11-06 RX ADMIN — DOCUSATE SODIUM SCH MG: 100 CAPSULE, LIQUID FILLED ORAL at 09:21

## 2021-11-06 RX ADMIN — DOCUSATE SODIUM SCH MG: 100 CAPSULE, LIQUID FILLED ORAL at 21:41

## 2021-11-06 RX ADMIN — ENOXAPARIN SODIUM SCH MG: 40 INJECTION SUBCUTANEOUS at 21:41

## 2021-11-06 RX ADMIN — ENOXAPARIN SODIUM SCH MG: 40 INJECTION SUBCUTANEOUS at 09:22

## 2021-11-06 RX ADMIN — ACETAMINOPHEN PRN MG: 325 TABLET ORAL at 18:50

## 2021-11-06 RX ADMIN — LEVOTHYROXINE SODIUM SCH MCG: 88 TABLET ORAL at 12:28

## 2021-11-06 RX ADMIN — AZTREONAM SCH MLS/HR: 1 INJECTION, POWDER, LYOPHILIZED, FOR SOLUTION INTRAMUSCULAR; INTRAVENOUS at 18:51

## 2021-11-06 RX ADMIN — ACETAMINOPHEN PRN MG: 325 TABLET ORAL at 10:50

## 2021-11-07 LAB
ALBUMIN SERPL-MCNC: 2.7 G/DL (ref 3.4–5)
ALP SERPL-CCNC: 75 U/L (ref 45–117)
ALT SERPL-CCNC: 47 U/L (ref 13–61)
ANION GAP SERPL CALC-SCNC: 3 MMOL/L (ref 8–16)
AST SERPL-CCNC: 39 U/L (ref 15–37)
BILIRUB SERPL-MCNC: 0.4 MG/DL (ref 0.2–1)
BUN SERPL-MCNC: 7.3 MG/DL (ref 7–18)
CALCIUM SERPL-MCNC: 8.4 MG/DL (ref 8.5–10.1)
CHLORIDE SERPL-SCNC: 108 MMOL/L (ref 98–107)
CO2 SERPL-SCNC: 28 MMOL/L (ref 21–32)
CREAT SERPL-MCNC: 0.6 MG/DL (ref 0.55–1.3)
DEPRECATED RDW RBC AUTO: 15 % (ref 11.6–15.6)
GLUCOSE SERPL-MCNC: 79 MG/DL (ref 74–106)
HCT VFR BLD CALC: 33.5 % (ref 32.4–45.2)
HGB BLD-MCNC: 11.2 GM/DL (ref 10.7–15.3)
MCH RBC QN AUTO: 27.7 PG (ref 25.7–33.7)
MCHC RBC AUTO-ENTMCNC: 33.3 G/DL (ref 32–36)
MCV RBC: 83.1 FL (ref 80–96)
PLATELET # BLD AUTO: 247 10^3/UL (ref 134–434)
PMV BLD: 7.7 FL (ref 7.5–11.1)
PROT SERPL-MCNC: 6.6 G/DL (ref 6.4–8.2)
RBC # BLD AUTO: 4.03 M/MM3 (ref 3.6–5.2)
SODIUM SERPL-SCNC: 139 MMOL/L (ref 136–145)
WBC # BLD AUTO: 5.9 K/MM3 (ref 4–10)

## 2021-11-07 RX ADMIN — ENOXAPARIN SODIUM SCH MG: 40 INJECTION SUBCUTANEOUS at 09:41

## 2021-11-07 RX ADMIN — DOCUSATE SODIUM SCH MG: 100 CAPSULE, LIQUID FILLED ORAL at 09:41

## 2021-11-07 RX ADMIN — ENOXAPARIN SODIUM SCH MG: 40 INJECTION SUBCUTANEOUS at 21:04

## 2021-11-07 RX ADMIN — LEVOTHYROXINE SODIUM SCH MCG: 88 TABLET ORAL at 09:40

## 2021-11-07 RX ADMIN — DOCUSATE SODIUM SCH MG: 100 CAPSULE, LIQUID FILLED ORAL at 21:04

## 2021-11-08 VITALS — SYSTOLIC BLOOD PRESSURE: 163 MMHG | DIASTOLIC BLOOD PRESSURE: 89 MMHG | TEMPERATURE: 97.8 F | HEART RATE: 77 BPM

## 2021-11-08 RX ADMIN — ENOXAPARIN SODIUM SCH MG: 40 INJECTION SUBCUTANEOUS at 09:54

## 2021-11-08 RX ADMIN — DOCUSATE SODIUM SCH MG: 100 CAPSULE, LIQUID FILLED ORAL at 09:54

## 2023-06-21 ENCOUNTER — HOSPITAL ENCOUNTER (EMERGENCY)
Dept: HOSPITAL 74 - JER | Age: 48
Discharge: HOME | End: 2023-06-21
Payer: COMMERCIAL

## 2023-06-21 VITALS — TEMPERATURE: 98.3 F

## 2023-06-21 VITALS — HEART RATE: 76 BPM | RESPIRATION RATE: 18 BRPM | DIASTOLIC BLOOD PRESSURE: 70 MMHG | SYSTOLIC BLOOD PRESSURE: 122 MMHG

## 2023-06-21 VITALS — BODY MASS INDEX: 41.2 KG/M2

## 2023-06-21 DIAGNOSIS — G43.909: ICD-10-CM

## 2023-06-21 DIAGNOSIS — R11.0: ICD-10-CM

## 2023-06-21 DIAGNOSIS — R42: Primary | ICD-10-CM

## 2023-06-21 LAB
ALBUMIN SERPL-MCNC: 3 G/DL (ref 3.4–5)
ALP SERPL-CCNC: 66 U/L (ref 45–117)
ALT SERPL-CCNC: 22 U/L (ref 13–61)
ANION GAP SERPL CALC-SCNC: 6 MMOL/L (ref 8–16)
APPEARANCE UR: CLEAR
AST SERPL-CCNC: 17 U/L (ref 15–37)
BACTERIA # UR AUTO: 1579 /UL (ref 0–1359)
BASOPHILS # BLD: 1.2 % (ref 0–2)
BILIRUB SERPL-MCNC: 0.3 MG/DL (ref 0.2–1)
BILIRUB UR STRIP.AUTO-MCNC: NEGATIVE MG/DL
BUN SERPL-MCNC: 7.8 MG/DL (ref 7–18)
CALCIUM SERPL-MCNC: 8.9 MG/DL (ref 8.5–10.1)
CASTS URNS QL MICRO: 3 /UL (ref 0–3.1)
CHLORIDE SERPL-SCNC: 107 MMOL/L (ref 98–107)
CO2 SERPL-SCNC: 27 MMOL/L (ref 21–32)
COLOR UR: YELLOW
CREAT SERPL-MCNC: 0.7 MG/DL (ref 0.55–1.3)
DEPRECATED RDW RBC AUTO: 15.1 % (ref 11.6–15.6)
EOSINOPHIL # BLD: 4.6 % (ref 0–4.5)
EPITH CASTS URNS QL MICRO: >36 /UL (ref 0–25.1)
GLUCOSE SERPL-MCNC: 138 MG/DL (ref 74–106)
HCT VFR BLD CALC: 35.8 % (ref 32.4–45.2)
HGB BLD-MCNC: 11.6 GM/DL (ref 10.7–15.3)
KETONES UR QL STRIP: (no result)
LEUKOCYTE ESTERASE UR QL STRIP.AUTO: (no result)
LYMPHOCYTES # BLD: 41.8 % (ref 8–40)
MAGNESIUM SERPL-MCNC: 2 MG/DL (ref 1.8–2.4)
MCH RBC QN AUTO: 27.2 PG (ref 25.7–33.7)
MCHC RBC AUTO-ENTMCNC: 32.4 G/DL (ref 32–36)
MCV RBC: 83.8 FL (ref 80–96)
MONOCYTES # BLD AUTO: 5.2 % (ref 3.8–10.2)
NEUTROPHILS # BLD: 47.2 % (ref 42.8–82.8)
NITRITE UR QL STRIP: NEGATIVE
PH UR: 5 [PH] (ref 5–8)
PHOSPHATE SERPL-MCNC: 2.3 MG/DL (ref 2.5–4.9)
PLATELET # BLD AUTO: 385 10^3/UL (ref 134–434)
PMV BLD: 8.1 FL (ref 7.5–11.1)
POTASSIUM SERPLBLD-SCNC: 3.9 MMOL/L (ref 3.5–5.1)
PROT SERPL-MCNC: 6.7 G/DL (ref 6.4–8.2)
PROT UR QL STRIP: NEGATIVE
PROT UR QL STRIP: NEGATIVE
RBC # BLD AUTO: 14 /UL (ref 0–23.9)
RBC # BLD AUTO: 4.28 M/MM3 (ref 3.6–5.2)
SODIUM SERPL-SCNC: 141 MMOL/L (ref 136–145)
SP GR UR: 1.03 (ref 1.01–1.03)
UROBILINOGEN UR STRIP-MCNC: 0.2 MG/DL (ref 0.2–1)
WBC # BLD AUTO: 6.4 K/MM3 (ref 4–10)
WBC # UR AUTO: 307 /UL (ref 0–25.8)

## 2023-06-21 PROCEDURE — 3E03329 INTRODUCTION OF OTHER ANTI-INFECTIVE INTO PERIPHERAL VEIN, PERCUTANEOUS APPROACH: ICD-10-PCS

## 2023-06-21 PROCEDURE — 3E033GC INTRODUCTION OF OTHER THERAPEUTIC SUBSTANCE INTO PERIPHERAL VEIN, PERCUTANEOUS APPROACH: ICD-10-PCS

## 2023-06-21 PROCEDURE — 3E033NZ INTRODUCTION OF ANALGESICS, HYPNOTICS, SEDATIVES INTO PERIPHERAL VEIN, PERCUTANEOUS APPROACH: ICD-10-PCS

## 2023-10-06 NOTE — PDOC
Denial reason below in PA information, you can change the dose.   Rapid Medical Evaluation


Chief Complaint: Injury


Time Seen by Provider: 04/22/19 17:00


Medical Evaluation: 


 Allergies











Allergy/AdvReac Type Severity Reaction Status Date / Time


 


Penicillins Allergy   Verified 10/23/17 20:22











04/22/19 17:00





I have performed a brief in-person evaluation of this patient.





The patient presents with a chief complaint of:R ankle/L knee pain s/p fall at 

work today. H/o fibromyalgia





Pertinent physical exam findings:Ace in place to L ankle and R knee





I have ordered the following:xray





The patient will proceed to the ED for further evaluation.





**Discharge Disposition





- Diagnosis


Ankle injury


Qualifiers:


 Encounter type: initial encounter Laterality: right Qualified Code(s): 

S99.911A - Unspecified injury of right ankle, initial encounter








- Referrals





- Patient Instructions





- Post Discharge Activity Calm